# Patient Record
Sex: MALE | Employment: FULL TIME | URBAN - METROPOLITAN AREA
[De-identification: names, ages, dates, MRNs, and addresses within clinical notes are randomized per-mention and may not be internally consistent; named-entity substitution may affect disease eponyms.]

---

## 2017-03-29 ENCOUNTER — OFFICE VISIT (OUTPATIENT)
Dept: URGENT CARE | Facility: URGENT CARE | Age: 32
End: 2017-03-29
Payer: COMMERCIAL

## 2017-03-29 VITALS — HEART RATE: 77 BPM | DIASTOLIC BLOOD PRESSURE: 93 MMHG | TEMPERATURE: 98.1 F | SYSTOLIC BLOOD PRESSURE: 151 MMHG

## 2017-03-29 DIAGNOSIS — L03.114 CELLULITIS OF LEFT UPPER EXTREMITY: Primary | ICD-10-CM

## 2017-03-29 PROCEDURE — 99203 OFFICE O/P NEW LOW 30 MIN: CPT | Performed by: FAMILY MEDICINE

## 2017-03-29 RX ORDER — CEPHALEXIN 500 MG/1
500 CAPSULE ORAL 4 TIMES DAILY
Qty: 40 CAPSULE | Refills: 0 | Status: SHIPPED | OUTPATIENT
Start: 2017-03-29 | End: 2017-04-08

## 2017-03-29 NOTE — LETTER
[place] [facility]  [address] [id number] [phone]      March 29, 2017    RE:  Pavan Ward                                                                                                                                                       [address]  [facility] [address]            To whom it may concern:    Pavan Ward is under my professional care for Medical.   He  may return to work with the following: No working or lifting restrictions on or about 4-1-17.          Sincerely,        Massimo Rod    [place] [facility]

## 2017-03-29 NOTE — NURSING NOTE
Chief Complaint   Patient presents with     Infection     possible infection of left arm from tattoo.     Letter for School/Work     request work note.       Initial BP (!) 151/93  Pulse 77  Temp 98.1  F (36.7  C) (Oral) There is no height or weight on file to calculate BMI.  Medication Reconciliation: complete

## 2017-03-29 NOTE — MR AVS SNAPSHOT
"              After Visit Summary   3/29/2017    Pavan Ward    MRN: [id number]           Patient Information     Date Of Birth          1985        Visit Information        Provider Department      3/29/2017 6:15 PM Massimo Rod, DO [facility]        Today's Diagnoses     Cellulitis of left upper extremity    -  1       Follow-ups after your visit        Who to contact     If you have questions or need follow up information about today's clinic visit or your schedule please contact [facility] directly at [phone].  Normal or non-critical lab and imaging results will be communicated to you by [employer], letter or phone within 4 business days after the clinic has received the results. If you do not hear from us within 7 days, please contact the clinic through [employer] or phone. If you have a critical or abnormal lab result, we will notify you by phone as soon as possible.  Submit refill requests through [employer] or call your pharmacy and they will forward the refill request to us. Please allow 3 business days for your refill to be completed.          Additional Information About Your Visit        MyChart Information     [employer] lets you send messages to your doctor, view your test results, renew your prescriptions, schedule appointments and more. To sign up, go to [email or web address] . Click on \"Log in\" on the left side of the screen, which will take you to the Welcome page. Then click on \"Sign up Now\" on the right side of the page.     You will be asked to enter the access code listed below, as well as some personal information. Please follow the directions to create your username and password.     Your access code is: [email or web address]  Expires: 2017  6:43 PM     Your access code will  in 90 days. If you need help or a new code, please call your [place] clinic or [phone].        Care EveryWhere ID     This is your Care EveryWhere " ID. This could be used by other organizations to access your [place] medical records  [id number]        Your Vitals Were     Pulse Temperature                77 98.1  F (36.7  C) (Oral)           Blood Pressure from Last 3 Encounters:   03/29/17 (!) 151/93    Weight from Last 3 Encounters:   No data found for Wt              Today, you had the following     No orders found for display         Today's Medication Changes          These changes are accurate as of: 3/29/17  6:43 PM.  If you have any questions, ask your nurse or doctor.               Start taking these medicines.        Dose/Directions    cephALEXin 500 MG capsule   Commonly known as:  KEFLEX   Used for:  Cellulitis of left upper extremity   Started by:  Massimo Rod,         Dose:  500 mg   Take 1 capsule (500 mg) by mouth 4 times daily for 10 days   Quantity:  40 capsule   Refills:  0            Where to get your medicines      These medications were sent to [facility] [address] [address]     Phone:  [phone]     cephALEXin 500 MG capsule                Primary Care Provider    None Specified       No primary provider on file.        Thank you!     Thank you for choosing [place] [facility]  for your care. Our goal is always to provide you with excellent care. Hearing back from our patients is one way we can continue to improve our services. Please take a few minutes to complete the written survey that you may receive in the mail after your visit with us. Thank you!             Your Updated Medication List - Protect others around you: Learn how to safely use, store and throw away your medicines at www.disposemymeds.org.          This list is accurate as of: 3/29/17  6:43 PM.  Always use your most recent med list.                   Brand Name Dispense Instructions for use    cephALEXin 500 MG capsule    KEFLEX    40 capsule    Take 1 capsule (500  mg) by mouth 4 times daily for 10 days

## 2017-03-29 NOTE — PROGRESS NOTES
SUBJECTIVE:Pavan Ward is a 31 year old male who presents to the clinic today for a rash.  Onset of rash was 3 day(s) ago.   Rash is still present.   Location of the rash: arm, upper.  Associated symptoms include: painful and redness.    Symptoms are mild and moderate and rash seems to be worsening.  Therapies tried to improve the rash: none.  Previous history of a similar rash? No  Recent exposure history: recent tatoo    No past medical history on file.  No Known Allergies  Social History   Substance Use Topics     Smoking status: Former Smoker     Smokeless tobacco: Never Used     Alcohol use Not on file       ROS:CONSTITUTIONAL:NEGATIVE for fever, chills, change in weight    EXAM: VITALS: BP (!) 151/93  Pulse 77  Temp 98.1  F (36.7  C) (Oral)  General:healthy,alert,no distress    Location: arm, upper     Distribution: localized     Lesion grouping: single patch and unilateral     Lesion type: macular     Color: red with tendernessPERTINENT EXAM: GENERAL APPEARANCE: healthy, alert and no distress      ICD-10-CM    1. Cellulitis of left upper extremity L03.114 cephALEXin (KEFLEX) 500 MG capsule       Follow-up with primary clinic if not improving

## 2018-01-07 ENCOUNTER — OFFICE VISIT (OUTPATIENT)
Dept: URGENT CARE | Facility: URGENT CARE | Age: 33
End: 2018-01-07
Payer: COMMERCIAL

## 2018-01-07 VITALS
TEMPERATURE: 97.6 F | DIASTOLIC BLOOD PRESSURE: 84 MMHG | HEART RATE: 86 BPM | OXYGEN SATURATION: 97 % | SYSTOLIC BLOOD PRESSURE: 143 MMHG

## 2018-01-07 DIAGNOSIS — K13.0 DRY LIPS: ICD-10-CM

## 2018-01-07 DIAGNOSIS — L24.9 IRRITANT CONTACT DERMATITIS, UNSPECIFIED TRIGGER: Primary | ICD-10-CM

## 2018-01-07 PROCEDURE — 99213 OFFICE O/P EST LOW 20 MIN: CPT | Performed by: PHYSICIAN ASSISTANT

## 2018-01-07 RX ORDER — DIAPER,BRIEF,INFANT-TODD,DISP
EACH MISCELLANEOUS
Qty: 30 G | Refills: 0 | Status: SHIPPED | OUTPATIENT
Start: 2018-01-07

## 2018-01-07 RX ORDER — METHYLPREDNISOLONE 4 MG
TABLET, DOSE PACK ORAL
Qty: 21 TABLET | Refills: 0 | Status: SHIPPED | OUTPATIENT
Start: 2018-01-07

## 2018-01-07 RX ORDER — LORATADINE 10 MG/1
10 TABLET ORAL DAILY
COMMUNITY

## 2018-01-07 NOTE — PROGRESS NOTES
SUBJECTIVE:   Pavan Ward is a 32 year old male presenting with a chief complaint of swelling of lips, edema and itching.  Onset of symptoms was 2 day(s) ago.  Course of illness is same.    Severity moderate  Current and Associated symptoms: swollen lips  Treatment measures tried include loratadine.  Predisposing factors include lip balm.         ALLERGIES   No Known Allergies      Social History   Substance Use Topics     Smoking status: Former Smoker     Smokeless tobacco: Never Used     Alcohol use Not on file       ROS:  CONSTITUTIONAL:NEGATIVE for fever, chills, change in weight  INTEGUMENTARY/SKIN: POSITIVE for swelling of lips upper and loewr  ENT/MOUTH: Positive for lower and upper lip swelling  NEURO: NEGATIVE for weakness, dizziness or paresthesias    OBJECTIVE  :/84  Pulse 86  Temp 97.6  F (36.4  C) (Oral)  SpO2 97%  GENERAL APPEARANCE: healthy, alert and no distress  EYES: EOMI,  PERRL, conjunctiva clear  HENT: ear canals and TM's normal.  Nose and mouth without ulcers, erythema or lesions  NECK: supple, nontender, no lymphadenopathy  NEURO: Normal strength and tone, sensory exam grossly normal,  normal speech and mentation  SKIN: Positive for upper and lower lip swelling, cracking and itching    ASSESSMENT/PLAN      ICD-10-CM    1. Irritant contact dermatitis, unspecified trigger L24.9 methylPREDNISolone (MEDROL DOSEPAK) 4 MG tablet     hydrocortisone 1 % ointment   2. Dry lips K13.0 hydrocortisone 1 % ointment       Follow up with PCP as needed  See orders in Epic

## 2018-01-07 NOTE — LETTER
[place] [facility] [facility]  [address] [address]  [phone]      January 7, 2018    RE:  Pavan Ward                                                                                                                                                       [address]  [facility] [address]            To whom it may concern:    Pavan Ward was seen in the urgent care today for an allergic reaction.  He will miss work 1/8/17.        Sincerely,        Avila Verma [facility]

## 2018-01-07 NOTE — MR AVS SNAPSHOT
"              After Visit Summary   2018    Pavan Ward    MRN: [id number]           Patient Information     Date Of Birth          1985        Visit Information        Provider Department      2018 1:45 PM Avila Verma PA-C [facility] [place]        Today's Diagnoses     Irritant contact dermatitis, unspecified trigger    -  1    Dry lips           Follow-ups after your visit        Who to contact     If you have questions or need follow up information about today's clinic visit or your schedule please contact [facility] directly at [phone].  Normal or non-critical lab and imaging results will be communicated to you by MyChart, letter or phone within 4 business days after the clinic has received the results. If you do not hear from us within 7 days, please contact the clinic through [employer] or phone. If you have a critical or abnormal lab result, we will notify you by phone as soon as possible.  Submit refill requests through [employer] or call your pharmacy and they will forward the refill request to us. Please allow 3 business days for your refill to be completed.          Additional Information About Your Visit        MyChart Information     [employer] lets you send messages to your doctor, view your test results, renew your prescriptions, schedule appointments and more. To sign up, go to [email or web address] . Click on \"Log in\" on the left side of the screen, which will take you to the Welcome page. Then click on \"Sign up Now\" on the right side of the page.     You will be asked to enter the access code listed below, as well as some personal information. Please follow the directions to create your username and password.     Your access code is: [email or web address]  Expires: 2018  3:33 PM     Your access code will  in 90 days. If you need help or a new code, please call your [place] clinic or [phone].        Care EveryWhere ID     " This is your Care EveryWhere ID. This could be used by other organizations to access your [place] medical records  [id number]        Your Vitals Were     Pulse Temperature Pulse Oximetry             86 97.6  F (36.4  C) (Oral) 97%          Blood Pressure from Last 3 Encounters:   01/07/18 143/84   03/29/17 (!) 151/93    Weight from Last 3 Encounters:   No data found for Wt              Today, you had the following     No orders found for display         Today's Medication Changes          These changes are accurate as of: 1/7/18  3:33 PM.  If you have any questions, ask your nurse or doctor.               Start taking these medicines.        Dose/Directions    hydrocortisone 1 % ointment   Used for:  Irritant contact dermatitis, unspecified trigger, Dry lips   Started by:  Avila Verma PA-C        Apply sparingly to affected area three times daily as needed   Quantity:  30 g   Refills:  0       methylPREDNISolone 4 MG tablet   Commonly known as:  MEDROL DOSEPAK   Used for:  Irritant contact dermatitis, unspecified trigger   Started by:  Avila Verma PA-C        Follow package instructions   Quantity:  21 tablet   Refills:  0            Where to get your medicines      These medications were sent to [facility] [address] [address]     Phone:  [phone]     hydrocortisone 1 % ointment    methylPREDNISolone 4 MG tablet                Primary Care Provider Fax #    Physician No Ref-Primary [phone]       No address on file        Equal Access to Services     JIM DORANTES : Hadii trinidad rodriguezo Sochantell, waaxda luqadaha, qaybta kaalmada adedevynyada, chloe rand . So [facility] [phone].    ATENCIÓN: Si habla español, tiene a guerin disposición servicios gratuitos de asistencia lingüística. Llame al [phone].    We comply with applicable federal civil rights laws and [place] laws. We do not discriminate on  the basis of race, color, national origin, age, disability, sex, sexual orientation, or gender identity.            Thank you!     Thank you for choosing [place] [facility]  for your care. Our goal is always to provide you with excellent care. Hearing back from our patients is one way we can continue to improve our services. Please take a few minutes to complete the written survey that you may receive in the mail after your visit with us. Thank you!             Your Updated Medication List - Protect others around you: Learn how to safely use, store and throw away your medicines at www.disposemymeds.org.          This list is accurate as of: 1/7/18  3:33 PM.  Always use your most recent med list.                   Brand Name Dispense Instructions for use Diagnosis    hydrocortisone 1 % ointment     30 g    Apply sparingly to affected area three times daily as needed    Irritant contact dermatitis, unspecified trigger, Dry lips       loratadine 10 MG tablet    CLARITIN     Take 10 mg by mouth daily        methylPREDNISolone 4 MG tablet    MEDROL DOSEPAK    21 tablet    Follow package instructions    Irritant contact dermatitis, unspecified trigger

## 2018-01-07 NOTE — NURSING NOTE
Chief Complaint   Patient presents with     Urgent Care     itchy, red and swollen lips since yesterday. States possible allergic reaction to vasline leonel lip balm.      Letter for School/Work     Request work note.        Initial /84  Pulse 86  Temp 97.6  F (36.4  C) (Oral)  SpO2 97% There is no height or weight on file to calculate BMI.  Medication Reconciliation: complete

## 2018-03-18 ENCOUNTER — OFFICE VISIT (OUTPATIENT)
Dept: URGENT CARE | Facility: URGENT CARE | Age: 33
End: 2018-03-18
Payer: COMMERCIAL

## 2018-03-18 VITALS
HEART RATE: 79 BPM | DIASTOLIC BLOOD PRESSURE: 78 MMHG | OXYGEN SATURATION: 100 % | BODY MASS INDEX: 44.38 KG/M2 | TEMPERATURE: 97.6 F | WEIGHT: 310 LBS | HEIGHT: 70 IN | RESPIRATION RATE: 16 BRPM | SYSTOLIC BLOOD PRESSURE: 138 MMHG

## 2018-03-18 DIAGNOSIS — L03.011 PARONYCHIA OF RIGHT INDEX FINGER: Primary | ICD-10-CM

## 2018-03-18 PROCEDURE — 99213 OFFICE O/P EST LOW 20 MIN: CPT | Performed by: FAMILY MEDICINE

## 2018-03-18 RX ORDER — CEPHALEXIN 500 MG/1
500 CAPSULE ORAL 3 TIMES DAILY
Qty: 30 CAPSULE | Refills: 0 | Status: SHIPPED | OUTPATIENT
Start: 2018-03-18

## 2018-03-18 NOTE — NURSING NOTE
"Chief Complaint   Patient presents with     Urgent Care     Derm Problem     concern of infection in right 2 nd finger.        Initial /78  Pulse 79  Temp 97.6  F (36.4  C) (Oral)  Resp 16  Ht 5' 10\" (1.778 m)  Wt (!) 310 lb (140.6 kg)  SpO2 100%  BMI 44.48 kg/m2 Estimated body mass index is 44.48 kg/(m^2) as calculated from the following:    Height as of this encounter: 5' 10\" (1.778 m).    Weight as of this encounter: 310 lb (140.6 kg).  Medication Reconciliation: complete  "

## 2018-03-18 NOTE — PROGRESS NOTES
"  SUBJECTIVE:   Pavan Ward is a 32 year old male who presents to clinic today for the following health issues:    RIGHT DIGIT pain    HPI     Presents with 4 day history of worsening RIGHT index finger pain at tip.  Denies any trauma or injury.  Feels pain locally at lateral nail edge.  Redness and swelling has worsened over weekend.  No fever or streaking of skin.    Problem list and histories reviewed & adjusted, as indicated.  Additional history: as documented        Patient Active Problem List   Diagnosis   (none) - all problems resolved or deleted     No past surgical history on file.    Social History   Substance Use Topics     Smoking status: Former Smoker     Smokeless tobacco: Never Used     Alcohol use Not on file     No family history on file.      Current Outpatient Prescriptions   Medication Sig Dispense Refill     cephALEXin (KEFLEX) 500 MG capsule Take 1 capsule (500 mg) by mouth 3 times daily 30 capsule 0     loratadine (CLARITIN) 10 MG tablet Take 10 mg by mouth daily       methylPREDNISolone (MEDROL DOSEPAK) 4 MG tablet Follow package instructions 21 tablet 0     hydrocortisone 1 % ointment Apply sparingly to affected area three times daily as needed 30 g 0     No Known Allergies  BP Readings from Last 3 Encounters:   03/18/18 138/78   01/07/18 143/84   03/29/17 (!) 151/93    Wt Readings from Last 3 Encounters:   03/18/18 (!) 310 lb (140.6 kg)                    ROS:  Constitutional, HEENT, cardiovascular, pulmonary, gi and gu systems are negative, except as otherwise noted.    OBJECTIVE:     /78  Pulse 79  Temp 97.6  F (36.4  C) (Oral)  Resp 16  Ht 5' 10\" (1.778 m)  Wt (!) 310 lb (140.6 kg)  SpO2 100%  BMI 44.48 kg/m2  Body mass index is 44.48 kg/(m^2).  GENERAL: healthy, alert and no distress  EYES: Eyes grossly normal to inspection, PERRL and conjunctivae and sclerae normal  NECK: no adenopathy, no asymmetry, masses, or scars and thyroid normal to palpation  MS: no gross " musculoskeletal defects noted, no edema  SKIN: paronychia RIGHT lateral edge of index fingernail with redness and swelling of digit tip and around base of nail, no drainage, tender to touch  NEURO: Normal strength and tone, mentation intact and speech normal  PSYCH: mentation appears normal, affect normal/bright    Diagnostic Test Results:  none     ASSESSMENT/PLAN:     1. Paronychia of right index finger    - cephALEXin (KEFLEX) 500 MG capsule; Take 1 capsule (500 mg) by mouth 3 times daily  Dispense: 30 capsule; Refill: 0    Continue with hot soaks and  nail from skin edge as able after soaking to help improve overall infection and prevent recurrence.  FU if no change or worsening symptoms prn.    Vera Black MD  [facility]

## 2018-03-18 NOTE — MR AVS SNAPSHOT
"              After Visit Summary   3/18/2018    Pavan Ward    MRN: [id number]           Patient Information     Date Of Birth          1985        Visit Information        Provider Department      3/18/2018 12:15 PM Vera Black MD [facility]        Today's Diagnoses     Paronychia of right index finger    -  1       Follow-ups after your visit        Follow-up notes from your care team     Return if symptoms worsen or fail to improve.      Who to contact     If you have questions or need follow up information about today's clinic visit or your schedule please contact [facility] directly at [phone].  Normal or non-critical lab and imaging results will be communicated to you by MyChart, letter or phone within 4 business days after the clinic has received the results. If you do not hear from us within 7 days, please contact the clinic through MyChart or phone. If you have a critical or abnormal lab result, we will notify you by phone as soon as possible.  Submit refill requests through [employer] or call your pharmacy and they will forward the refill request to us. Please allow 3 business days for your refill to be completed.          Additional Information About Your Visit        MyChart Information     [employer] lets you send messages to your doctor, view your test results, renew your prescriptions, schedule appointments and more. To sign up, go to [email or web address] . Click on \"Log in\" on the left side of the screen, which will take you to the Welcome page. Then click on \"Sign up Now\" on the right side of the page.     You will be asked to enter the access code listed below, as well as some personal information. Please follow the directions to create your username and password.     Your access code is: [email or web address]  Expires: 2018  4:33 PM     Your access code will  in 90 days. If you need help or a new code, please call " "your [place] clinic or [phone].        Care EveryWhere ID     This is your Care EveryWhere ID. This could be used by other organizations to access your [place] medical records  [id number]        Your Vitals Were     Pulse Temperature Respirations Height Pulse Oximetry BMI (Body Mass Index)    79 97.6  F (36.4  C) (Oral) 16 5' 10\" (1.778 m) 100% 44.48 kg/m2       Blood Pressure from Last 3 Encounters:   03/18/18 138/78   01/07/18 143/84   03/29/17 (!) 151/93    Weight from Last 3 Encounters:   03/18/18 (!) 310 lb (140.6 kg)              Today, you had the following     No orders found for display         Today's Medication Changes          These changes are accurate as of 3/18/18  1:04 PM.  If you have any questions, ask your nurse or doctor.               Start taking these medicines.        Dose/Directions    cephALEXin 500 MG capsule   Commonly known as:  KEFLEX   Used for:  Paronychia of right index finger   Started by:  Vera Black MD        Dose:  500 mg   Take 1 capsule (500 mg) by mouth 3 times daily   Quantity:  30 capsule   Refills:  0            Where to get your medicines      [facility] [address] [place] [address] AT [address] [address]     Phone:  [phone]     cephALEXin 500 MG capsule                Primary Care Provider Fax #    Physician No Ref-Primary [phone]       No address on file        Equal Access to Services     JIM DORANTES AH: Hadii aad ku hadasho Soannali, waaxda luqadaha, qaybta kaalmada chloe donaldson. So [facility] [phone].    ATENCIÓN: Si habla español, tiene a guerin disposición servicios gratuitos de asistencia lingüística. Llame al [phone].    We comply with applicable federal civil rights laws and [place] laws. We do not discriminate on the basis of race, color, national origin, age, " disability, sex, sexual orientation, or gender identity.            Thank you!     Thank you for choosing [facility]  for your care. Our goal is always to provide you with excellent care. Hearing back from our patients is one way we can continue to improve our services. Please take a few minutes to complete the written survey that you may receive in the mail after your visit with us. Thank you!             Your Updated Medication List - Protect others around you: Learn how to safely use, store and throw away your medicines at www.disposemymeds.org.          This list is accurate as of 3/18/18  1:04 PM.  Always use your most recent med list.                   Brand Name Dispense Instructions for use Diagnosis    cephALEXin 500 MG capsule    KEFLEX    30 capsule    Take 1 capsule (500 mg) by mouth 3 times daily    Paronychia of right index finger       hydrocortisone 1 % ointment     30 g    Apply sparingly to affected area three times daily as needed    Irritant contact dermatitis, unspecified trigger, Dry lips       loratadine 10 MG tablet    CLARITIN     Take 10 mg by mouth daily        methylPREDNISolone 4 MG tablet    MEDROL DOSEPAK    21 tablet    Follow package instructions    Irritant contact dermatitis, unspecified trigger

## 2018-06-27 ENCOUNTER — OFFICE VISIT (OUTPATIENT)
Dept: URGENT CARE | Facility: URGENT CARE | Age: 33
End: 2018-06-27
Payer: COMMERCIAL

## 2018-06-27 VITALS
HEART RATE: 68 BPM | SYSTOLIC BLOOD PRESSURE: 138 MMHG | RESPIRATION RATE: 18 BRPM | TEMPERATURE: 98 F | OXYGEN SATURATION: 97 % | DIASTOLIC BLOOD PRESSURE: 100 MMHG

## 2018-06-27 DIAGNOSIS — R03.0 ELEVATED BLOOD PRESSURE READING WITHOUT DIAGNOSIS OF HYPERTENSION: Primary | ICD-10-CM

## 2018-06-27 PROCEDURE — 99213 OFFICE O/P EST LOW 20 MIN: CPT | Performed by: PHYSICIAN ASSISTANT

## 2018-06-27 NOTE — MR AVS SNAPSHOT
"              After Visit Summary   6/27/2018    Pavan Ward    MRN: [id number]           Patient Information     Date Of Birth          1985        Visit Information        Provider Department      6/27/2018 12:15 PM Trish Ramey PA-C [facility]        Today's Diagnoses     Elevated blood pressure reading without diagnosis of hypertension    -  1      Care Instructions    (R03.0) Elevated blood pressure reading without diagnosis of hypertension  (primary encounter diagnosis)  Comment:   Plan: follow up with internal medicine.      Make sure you eat and drink well before exercising.                Follow-ups after your visit        Who to contact     If you have questions or need follow up information about today's clinic visit or your schedule please contact [facility] directly at [phone].  Normal or non-critical lab and imaging results will be communicated to you by Pureshieldhart, letter or phone within 4 business days after the clinic has received the results. If you do not hear from us within 7 days, please contact the clinic through Pureshieldhart or phone. If you have a critical or abnormal lab result, we will notify you by phone as soon as possible.  Submit refill requests through Fullbridge or call your pharmacy and they will forward the refill request to us. Please allow 3 business days for your refill to be completed.          Additional Information About Your Visit        MyChart Information     Fullbridge lets you send messages to your doctor, view your test results, renew your prescriptions, schedule appointments and more. To sign up, go to [email or web address] . Click on \"Log in\" on the left side of the screen, which will take you to the Welcome page. Then click on \"Sign up Now\" on the right side of the page.     You will be asked to enter the access code listed below, as well as some personal information. Please follow the directions " to create your username and password.     Your access code is: [email or web address]  Expires: 2018  1:09 PM     Your access code will  in 90 days. If you need help or a new code, please call your [facility] clinic or [phone].        Care EveryWhere ID     This is your Care EveryWhere ID. This could be used by other organizations to access your [facility] medical records  [id number]        Your Vitals Were     Pulse Temperature Respirations Pulse Oximetry          68 98  F (36.7  C) (Oral) 18 97%         Blood Pressure from Last 3 Encounters:   18 (!) 138/100   18 138/78   18 143/84    Weight from Last 3 Encounters:   18 (!) 310 lb (140.6 kg)              Today, you had the following     No orders found for display       Primary Care Provider Fax #    Physician No Ref-Primary [phone]       No address on file        Equal Access to Services     JIM DORANTES : Hadii aad ku hadasho Soomaali, waaxda luqadaha, qaybta kaalmada adeegyada, waxay giovannain hayamadeon lucy rand . So [facility] [phone].    ATENCIÓN: Si habla español, tiene a guerin disposición servicios gratuitos de asistencia lingüística. Llame al [phone].    We comply with applicable federal civil rights laws and [place] laws. We do not discriminate on the basis of race, color, national origin, age, disability, sex, sexual orientation, or gender identity.            Thank you!     Thank you for choosing [facility] [place]  for your care. Our goal is always to provide you with excellent care. Hearing back from our patients is one way we can continue to improve our services. Please take a few minutes to complete the written survey that you may receive in the mail after your visit with us. Thank you!             Your Updated Medication List - Protect others around you: Learn how to safely use, store and throw away your medicines at www.disposemymeds.org.          This list is accurate as of 18   1:09 PM.  Always use your most recent med list.                   Brand Name Dispense Instructions for use Diagnosis    cephALEXin 500 MG capsule    KEFLEX    30 capsule    Take 1 capsule (500 mg) by mouth 3 times daily    Paronychia of right index finger       hydrocortisone 1 % ointment     30 g    Apply sparingly to affected area three times daily as needed    Irritant contact dermatitis, unspecified trigger, Dry lips       loratadine 10 MG tablet    CLARITIN     Take 10 mg by mouth daily        methylPREDNISolone 4 MG tablet    MEDROL DOSEPAK    21 tablet    Follow package instructions    Irritant contact dermatitis, unspecified trigger

## 2018-06-27 NOTE — PROGRESS NOTES
SUBJECTIVE:   Pavan Ward is a 32 year old male presenting with a chief complaint of:  1) elevated blood pressure at work today.  190-100 or so with wrist cuff.    Denies any headaches.    Does work out on a daily basis, lifting heavy weights, into body building.  Drinks one gallon of water a day, with the exception of yesterday he did not drink enough water before working out, felt light headed this morning.      Denies any nausea or vomiting.      Actively trying to lose weight and get back into shape.      SH: works at [employer].        No past medical history on file.  Patient Active Problem List   Diagnosis   (none) - all problems resolved or deleted     Social History   Substance Use Topics     Smoking status: Former Smoker     Smokeless tobacco: Never Used     Alcohol use Not on file       ROS:  CONSTITUTIONAL:NEGATIVE for fever, chills, change in weight  INTEGUMENTARY/SKIN: NEGATIVE for worrisome rashes, moles or lesions  RESP:NEGATIVE for significant cough or SOB  CV: NEGATIVE for chest pain, palpitations or peripheral edema  MUSCULOSKELETAL: NEGATIVE for significant arthralgias or myalgia  NEURO: NEGATIVE for weakness, dizziness or paresthesias  Review of systems negative except as stated above.    OBJECTIVE  :BP (!) 138/100  Pulse 68  Temp 98  F (36.7  C) (Oral)  Resp 18  SpO2 97%  GENERAL APPEARANCE: healthy, alert and no distress  EYES: EOMI,  PERRL, conjunctiva clear  HENT: ear canals and TM's normal.  Nose and mouth without ulcers, erythema or lesions  NECK: supple, nontender, no lymphadenopathy  RESP: lungs clear to auscultation - no rales, rhonchi or wheezes  CV: regular rates and rhythm, normal S1 S2, no murmur noted  NEURO: Normal strength and tone, sensory exam grossly normal,  normal speech and mentation  SKIN: no suspicious lesions or rashes    (R03.0) Elevated blood pressure reading without diagnosis of hypertension  (primary encounter diagnosis)  Comment: blood pressure at end of visit  128/78  Plan: follow up with internal medicine.      Make sure you eat and drink well before exercising.      Establish care with internal medicine and follow up within the next 2 weeks, sooner should symptoms recur.    Patient expresses understanding and agreement with the assessment and plan as above.

## 2018-06-27 NOTE — PATIENT INSTRUCTIONS
(R03.0) Elevated blood pressure reading without diagnosis of hypertension  (primary encounter diagnosis)  Comment:   Plan: follow up with internal medicine.      Make sure you eat and drink well before exercising.

## 2019-09-10 ENCOUNTER — OFFICE VISIT (OUTPATIENT)
Dept: URGENT CARE | Facility: URGENT CARE | Age: 34
End: 2019-09-10
Payer: COMMERCIAL

## 2019-09-10 VITALS
WEIGHT: 282 LBS | SYSTOLIC BLOOD PRESSURE: 140 MMHG | HEIGHT: 70 IN | OXYGEN SATURATION: 98 % | HEART RATE: 83 BPM | RESPIRATION RATE: 16 BRPM | DIASTOLIC BLOOD PRESSURE: 90 MMHG | BODY MASS INDEX: 40.37 KG/M2 | TEMPERATURE: 97.9 F

## 2019-09-10 DIAGNOSIS — H66.92 BACTERIAL INFECTION OF LEFT EAR: ICD-10-CM

## 2019-09-10 DIAGNOSIS — B96.89 BACTERIAL INFECTION OF LEFT EAR: ICD-10-CM

## 2019-09-10 DIAGNOSIS — H92.02 LEFT EAR PAIN: ICD-10-CM

## 2019-09-10 DIAGNOSIS — H60.502 ACUTE OTITIS EXTERNA OF LEFT EAR, UNSPECIFIED TYPE: Primary | ICD-10-CM

## 2019-09-10 PROBLEM — E66.01 MORBID OBESITY (H): Status: ACTIVE | Noted: 2019-09-10

## 2019-09-10 PROCEDURE — 99214 OFFICE O/P EST MOD 30 MIN: CPT | Performed by: FAMILY MEDICINE

## 2019-09-10 RX ORDER — AMOXICILLIN 500 MG/1
500 CAPSULE ORAL 3 TIMES DAILY
Qty: 30 CAPSULE | Refills: 0 | Status: SHIPPED | OUTPATIENT
Start: 2019-09-10 | End: 2019-09-20

## 2019-09-10 RX ORDER — CIPROFLOXACIN AND DEXAMETHASONE 3; 1 MG/ML; MG/ML
4 SUSPENSION/ DROPS AURICULAR (OTIC) 2 TIMES DAILY
Qty: 7.5 BOTTLE | Refills: 0 | Status: SHIPPED | OUTPATIENT
Start: 2019-09-10

## 2019-09-10 ASSESSMENT — MIFFLIN-ST. JEOR: SCORE: 2240.39

## 2019-09-10 NOTE — PROGRESS NOTES
"SUBJECTIVE:  Pavan Ward is a 33 year old male who presents with left ear pain, fullness, discharge and blockage for 1 day(s). he did use leftover medication for otitis externa for a day which did not help him  Severity: moderate   Timing:sudden onset  Additional symptoms include none.      History of recurrent otitis: no    History reviewed. No pertinent past medical history.  Current Outpatient Medications   Medication Sig Dispense Refill     amoxicillin (AMOXIL) 500 MG capsule Take 1 capsule (500 mg) by mouth 3 times daily for 10 days 30 capsule 0     ciprofloxacin-dexamethasone (CIPRODEX) 0.3-0.1 % otic suspension Place 4 drops Into the left ear 2 times daily 7.5 Bottle 0     loratadine (CLARITIN) 10 MG tablet Take 10 mg by mouth daily       cephALEXin (KEFLEX) 500 MG capsule Take 1 capsule (500 mg) by mouth 3 times daily (Patient not taking: Reported on 6/27/2018) 30 capsule 0     hydrocortisone 1 % ointment Apply sparingly to affected area three times daily as needed (Patient not taking: Reported on 6/27/2018) 30 g 0     methylPREDNISolone (MEDROL DOSEPAK) 4 MG tablet Follow package instructions (Patient not taking: Reported on 6/27/2018) 21 tablet 0     Social History     Tobacco Use     Smoking status: Former Smoker     Smokeless tobacco: Never Used   Substance Use Topics     Alcohol use: Not on file       ROS:   10 point ROS of systems including Constitutional, Eyes, Respiratory, Cardiovascular, Gastroenterology, Genitourinary, Integumentary, Muscularskeletal, Psychiatric were all negative except for pertinent positives noted in my HPI           OBJECTIVE:  BP (!) 140/90 (BP Location: Left arm, Patient Position: Sitting, Cuff Size: Adult Regular)   Pulse 83   Temp 97.9  F (36.6  C) (Oral)   Resp 16   Ht 1.778 m (5' 10\")   Wt 127.9 kg (282 lb)   SpO2 98%   BMI 40.46 kg/m     EXAM:  The right TM is normal: no effusions, no erythema, and normal landmarks     The right auditory canal is normal and " without drainage, edema or erythema  The left TM is betina colored, bullae present, distorted light reflex, obscured landmarks and purulent drainage  The left auditory canal is tender  Oropharynx exam is normal: no lesions, erythema, adenopathy or exudate.  GENERAL: no acute distress  EYES: EOMI,  PERRL, conjunctiva clear  NECK: supple, non-tender to palpation, no adenopathy noted  RESP: lungs clear to auscultation - no rales, rhonchi or wheezes  CV: regular rates and rhythm, normal S1 S2, no murmur noted  SKIN: no suspicious lesions or rashes     ASSESSMENT:  Pavan was seen today for otalgia.    Diagnoses and all orders for this visit:    Acute otitis externa of left ear, unspecified type  -     ciprofloxacin-dexamethasone (CIPRODEX) 0.3-0.1 % otic suspension; Place 4 drops Into the left ear 2 times daily    Bacterial infection of left ear  -     amoxicillin (AMOXIL) 500 MG capsule; Take 1 capsule (500 mg) by mouth 3 times daily for 10 days    Left ear pain            PLAN:  See orders in Epic  This with patient to start the drop as directed  If symptoms do not get better in 48 hours patient was given a prescription for amoxicillin advised to stop that.    I did discuss with patient to do either Tylenol or ibuprofen to help with the pain  Follow up if  symptoms fail to improve or worsens   Pt understood and agreed with plan     Rsoi Gama MD

## 2019-10-23 ENCOUNTER — OFFICE VISIT (OUTPATIENT)
Dept: URGENT CARE | Facility: URGENT CARE | Age: 34
End: 2019-10-23
Payer: COMMERCIAL

## 2019-10-23 VITALS
DIASTOLIC BLOOD PRESSURE: 80 MMHG | WEIGHT: 285 LBS | BODY MASS INDEX: 40.8 KG/M2 | TEMPERATURE: 97.8 F | OXYGEN SATURATION: 97 % | HEIGHT: 70 IN | HEART RATE: 74 BPM | RESPIRATION RATE: 16 BRPM | SYSTOLIC BLOOD PRESSURE: 118 MMHG

## 2019-10-23 DIAGNOSIS — J01.90 ACUTE SINUSITIS WITH SYMPTOMS > 10 DAYS: Primary | ICD-10-CM

## 2019-10-23 DIAGNOSIS — R07.0 THROAT PAIN: ICD-10-CM

## 2019-10-23 LAB
DEPRECATED S PYO AG THROAT QL EIA: NORMAL
SPECIMEN SOURCE: NORMAL

## 2019-10-23 PROCEDURE — 99214 OFFICE O/P EST MOD 30 MIN: CPT | Performed by: PHYSICIAN ASSISTANT

## 2019-10-23 PROCEDURE — 87081 CULTURE SCREEN ONLY: CPT | Performed by: PHYSICIAN ASSISTANT

## 2019-10-23 PROCEDURE — 87880 STREP A ASSAY W/OPTIC: CPT | Performed by: PHYSICIAN ASSISTANT

## 2019-10-23 RX ORDER — FLUTICASONE PROPIONATE 50 MCG
2 SPRAY, SUSPENSION (ML) NASAL DAILY
Qty: 16 G | Refills: 0 | Status: SHIPPED | OUTPATIENT
Start: 2019-10-23

## 2019-10-23 RX ORDER — IBUPROFEN 800 MG/1
800 TABLET, FILM COATED ORAL EVERY 8 HOURS PRN
Qty: 100 TABLET | Refills: 0 | Status: SHIPPED | OUTPATIENT
Start: 2019-10-23

## 2019-10-23 RX ORDER — CEFUROXIME AXETIL 500 MG/1
500 TABLET ORAL 2 TIMES DAILY
Qty: 20 TABLET | Refills: 0 | Status: SHIPPED | OUTPATIENT
Start: 2019-10-23 | End: 2019-11-02

## 2019-10-23 ASSESSMENT — MIFFLIN-ST. JEOR: SCORE: 2254

## 2019-10-23 NOTE — PROGRESS NOTES
"Patient presents with:  Urgent Care: sorethroat and losing for voice for 11 days    SUBJECTIVE:   Pavan Ward is a 33 year old male presenting with a chief complaint of:  1) sinus congestion for the past 11 days, improved, but worsened again  2) throat pain for the past 11 days, worse today, hard to swallow.    No fevers.    3) cough at night  Onset of symptoms was as above   Course of illness is worsening.    Severity moderate  Current and Associated symptoms: as above  Treatment measures tried include throat spray, tylenol. Mucinex, cough drops.  Predisposing factors include none known.    No past medical history on file.  Patient Active Problem List   Diagnosis     Morbid obesity (H)     Social History     Tobacco Use     Smoking status: Former Smoker     Smokeless tobacco: Never Used   Substance Use Topics     Alcohol use: Not on file       ROS:  CONSTITUTIONAL:NEGATIVE for fever, chills, change in weight  INTEGUMENTARY/SKIN: NEGATIVE for worrisome rashes, moles or lesions  EYES: NEGATIVE for vision changes or irritation  ENT/MOUTH: as per HPI  RESP:as per HPI  CV: NEGATIVE for chest pain, palpitations or peripheral edema  GI: NEGATIVE for nausea, abdominal pain, heartburn, or change in bowel habits  : negative for dysuria, hematuria, decreased urinary stream, erectile dysfunction  MUSCULOSKELETAL: NEGATIVE for significant arthralgias or myalgia  NEURO: NEGATIVE for weakness, dizziness or paresthesias  Review of systems negative except as stated above.    OBJECTIVE  :/80   Pulse 74   Temp 97.8  F (36.6  C) (Oral)   Resp 16   Ht 1.778 m (5' 10\")   Wt 129.3 kg (285 lb)   SpO2 97%   BMI 40.89 kg/m    GENERAL APPEARANCE: healthy, alert and no distress  EYES: EOMI,  PERRL, conjunctiva clear  HENT: ear canals and TM's normal.  Nose and mouth without ulcers, erythema or lesions  HENT: nasal turbinates boggy with bluish hue and rhinorrhea yellow  NECK: supple, nontender, no lymphadenopathy  RESP: lungs " clear to auscultation - no rales, rhonchi or wheezes  CV: regular rates and rhythm, normal S1 S2, no murmur noted  ABDOMEN:  soft, nontender, no HSM or masses and bowel sounds normal  NEURO: Normal strength and tone, sensory exam grossly normal,  normal speech and mentation  SKIN: no suspicious lesions or rashes    (J01.90) Acute sinusitis with symptoms > 10 days  (primary encounter diagnosis)  Comment:   Plan: fluticasone (FLONASE) 50 MCG/ACT nasal spray use nightly for 2 weeks.          cefuroxime (CEFTIN) 500 MG tablet            (R07.0) Throat pain  Comment:   Plan: Strep, Rapid Screen, Beta strep group A         culture, ibuprofen (ADVIL/MOTRIN) 800 MG tablet  You may also try benadryl 25 mg at bedtime to help with the post nasal drip which is causing the pain.            Salt water gargles.      Rest.     Follow up with primary clinic should symptoms persist or worsen.

## 2019-10-23 NOTE — LETTER
[place] [facility]  [address] [id number] [phone]      October 23, 2019    RE:  Pavan Ward                                                                                                                                                       [address]  [facility] [address]            To whom it may concern:    Pavan Ward was seen in clinic today for illness.  He may return to work tomorrow so long as he remains fever free.            Sincerely,        Trish Lind PA-C    [place] [facility]

## 2019-10-23 NOTE — PATIENT INSTRUCTIONS
(J01.90) Acute sinusitis with symptoms > 10 days  (primary encounter diagnosis)  Comment:   Plan: fluticasone (FLONASE) 50 MCG/ACT nasal spray use nightly for 2 weeks.          cefuroxime (CEFTIN) 500 MG tablet            (R07.0) Throat pain  Comment:   Plan: Strep, Rapid Screen, Beta strep group A         culture, ibuprofen (ADVIL/MOTRIN) 800 MG tablet  You may also try benadryl 25 mg at bedtime to help with the post nasal drip which is causing the pain.            Salt water gargles.      Rest.     Follow up with primary clinic should symptoms persist or worsen.

## 2019-10-24 LAB
BACTERIA SPEC CULT: NORMAL
SPECIMEN SOURCE: NORMAL

## 2020-11-12 ENCOUNTER — ANCILLARY PROCEDURE (OUTPATIENT)
Dept: GENERAL RADIOLOGY | Facility: CLINIC | Age: 35
End: 2020-11-12
Attending: FAMILY MEDICINE
Payer: COMMERCIAL

## 2020-11-12 ENCOUNTER — OFFICE VISIT (OUTPATIENT)
Dept: URGENT CARE | Facility: URGENT CARE | Age: 35
End: 2020-11-12
Payer: COMMERCIAL

## 2020-11-12 VITALS
DIASTOLIC BLOOD PRESSURE: 85 MMHG | HEART RATE: 80 BPM | OXYGEN SATURATION: 98 % | RESPIRATION RATE: 16 BRPM | SYSTOLIC BLOOD PRESSURE: 138 MMHG | BODY MASS INDEX: 41.47 KG/M2 | WEIGHT: 289 LBS

## 2020-11-12 DIAGNOSIS — S89.92XA KNEE INJURY, LEFT, INITIAL ENCOUNTER: Primary | ICD-10-CM

## 2020-11-12 PROCEDURE — 73562 X-RAY EXAM OF KNEE 3: CPT | Mod: LT | Performed by: RADIOLOGY

## 2020-11-12 PROCEDURE — 99214 OFFICE O/P EST MOD 30 MIN: CPT | Performed by: FAMILY MEDICINE

## 2020-11-12 NOTE — LETTER
[facility]  [address] [address]  [phone]      November 12, 2020    RE:  Pavan Ward                                                                                                                                                       [address]  [facility] [address]            To whom it may concern:    Pavan Ward is under my professional care for Knee injury, left, initial encounter.   He may return to work tomorrow with the following: Light duty-no lifting this week.          Sincerely,        Massimo Rod DO    [facility]

## 2020-11-12 NOTE — PROGRESS NOTES
SUBJECTIVE:  Chief Complaint   Patient presents with     Knee Pain     Pt fell this morning and is having L knee pain and swelling    .ident presents with a chief complaint of left knee.  The injury occurred 2 hours ago.   The injury happened while at home.   How: trauma: slipped on ice immediate pain  The patient complained of moderate pain and has had decreased ROM.    Pain exacerbated by weight-bearing and movement    This is the first time this type of injury has occurred to this patient.     No past medical history on file.  No Known Allergies  Social History     Tobacco Use     Smoking status: Former Smoker     Smokeless tobacco: Never Used   Substance Use Topics     Alcohol use: Not on file       ROSINTEGUMENTARY/SKIN: NEGATIVE for open wound/bleeding and NEGATIVE for bruising    EXAM: /85   Pulse 80   Resp 16   Wt 131.1 kg (289 lb)   SpO2 98%   BMI 41.47 kg/m  Gen: healthy,alert,no distress  Extremity: knee has pain with palpation and rom.   There is not compromise to the distal circulation.  Pulses are +2 and   EXTREMITIES: peripheral pulses normal  SKIN: no suspicious lesions or rashes  NEURO: Normal strength and tone, sensory exam grossly normal, mentation intact and speech normal    Xray without acute findings, no fx read by Massimo Rod D.O.      ICD-10-CM    1. Knee injury, left, initial encounter  S89.92XA XR Knee Left 3 Views     Knee Supplies Order for DME - ONLY FOR DME     OTC meds  RICE

## 2023-11-15 ENCOUNTER — OFFICE VISIT (OUTPATIENT)
Dept: URGENT CARE | Facility: URGENT CARE | Age: 38
End: 2023-11-15
Payer: COMMERCIAL

## 2023-11-15 VITALS
WEIGHT: 303 LBS | BODY MASS INDEX: 43.48 KG/M2 | SYSTOLIC BLOOD PRESSURE: 157 MMHG | OXYGEN SATURATION: 99 % | DIASTOLIC BLOOD PRESSURE: 100 MMHG | HEART RATE: 85 BPM | TEMPERATURE: 97.5 F

## 2023-11-15 DIAGNOSIS — R42 VERTIGO: Primary | ICD-10-CM

## 2023-11-15 DIAGNOSIS — J01.90 ACUTE NON-RECURRENT SINUSITIS, UNSPECIFIED LOCATION: ICD-10-CM

## 2023-11-15 PROCEDURE — 99204 OFFICE O/P NEW MOD 45 MIN: CPT | Performed by: PHYSICIAN ASSISTANT

## 2023-11-15 RX ORDER — FLUTICASONE PROPIONATE 50 MCG
1 SPRAY, SUSPENSION (ML) NASAL DAILY
Qty: 15.8 ML | Refills: 0 | Status: SHIPPED | OUTPATIENT
Start: 2023-11-15

## 2023-11-15 RX ORDER — GUAIFENESIN 600 MG/1
1200 TABLET, EXTENDED RELEASE ORAL 2 TIMES DAILY
Qty: 28 TABLET | Refills: 0 | Status: SHIPPED | OUTPATIENT
Start: 2023-11-15 | End: 2023-11-22

## 2023-11-15 RX ORDER — MECLIZINE HYDROCHLORIDE 25 MG/1
25 TABLET ORAL 3 TIMES DAILY PRN
Qty: 30 TABLET | Refills: 0 | Status: SHIPPED | OUTPATIENT
Start: 2023-11-15

## 2023-11-15 NOTE — LETTER
November 15, 2023      Pavan Ward  [address]  [facility] [address]        To Whom It May Concern:    Pavan Ward  was seen on 11/15.  Please excuse him  until 11/17 due to illness.        Sincerely,        Sanjay Hayes PA-C

## 2023-11-15 NOTE — PATIENT INSTRUCTIONS
1.  Plenty of fluids, rest, warm compresses on face  2.  Mucinex twice daily for at least 4 days  3.  Priya Pot 1x in the morning 1x at night (SALINE MIST SPRAY IS AN ACCEPTABLE, THOUGH NOT AS EFFECTIVE REPLACEMENT)  4.  Benadryl (diphenhydramine) at bedtime   5.  Either Claritin (Loratadine), Allegra (Fexofenadine), or Zyrtec (Cetirizine) in the day  6.  Flonase (Fluticasone) 2x each nostril twice a day for two weeks, then once each nostril once a day       Please let us know if symptoms persist, or worsen.  Fever and focal pain may be a sign of a bacterial infection which may need antibiotic treatment

## 2023-11-15 NOTE — PROGRESS NOTES
SUBJECTIVE:   Pavan Ward is a 37 year old male who complains of new onset positional vertigo for 1 days. Has ot had this in the past. The patient denies any other symptoms of neurological impairment or TIA's; no amaurosis, diplopia, dysphasia, or unilateral disturbance of motor or sensory function. No headaches. No hearing loss or tinnitus, nor head injury. No palpitations or syncope. Healthy in the past. Has had sinusitis for several days.  Covid negative    OBJECTIVE:  Appears well, in no apparent distress. Vitals normal. Ears normal. Neck supple. No adenopathy or masses in the neck or supraclavicular regions. Cranial nerves are normal. MARY KATE. EOM's intact. DTR's normal and symmetric. Mental status normal. Gait and station normal. Romberg negative. Cerebellar function is normal. No internuclear ophthalmoplegia. Pulse regular. Rapid changes in position during the exam do precipitate brief dizziness with horizontal nystagmus.    ASSESSMENT:  (R42) Vertigo  (primary encounter diagnosis)  Comment: BPPV  Plan: meclizine (ANTIVERT) 25 MG tablet      (J01.90) Acute non-recurrent sinusitis, unspecified location  Plan: fluticasone (FLONASE) 50 MCG/ACT nasal spray,         guaiFENesin (MUCINEX) 600 MG 12 hr tablet        PLAN:  The patient is reassured that these symptoms do not appear to represent a serious or threatening condition. This is generally a self-limited temporary but uncomfortable situation. Rest, avoid potentially dangerous activities (such as driving or working with machinery or at heights), use OTC Meclizine prn. Asked to call if develops other symptoms, such as alterations of speech, swallowing, vision, motor or sensory systems, or if dizziness persists or worsens.